# Patient Record
Sex: FEMALE | Race: WHITE | NOT HISPANIC OR LATINO | Employment: FULL TIME | URBAN - METROPOLITAN AREA
[De-identification: names, ages, dates, MRNs, and addresses within clinical notes are randomized per-mention and may not be internally consistent; named-entity substitution may affect disease eponyms.]

---

## 2018-04-14 RX ORDER — CITALOPRAM 20 MG/1
TABLET, FILM COATED ORAL
Qty: 90 TABLET | OUTPATIENT
Start: 2018-04-14

## 2018-08-03 RX ORDER — CITALOPRAM 20 MG/1
TABLET, FILM COATED ORAL
Qty: 90 TABLET | OUTPATIENT
Start: 2018-08-03

## 2019-01-31 RX ORDER — CITALOPRAM 20 MG/1
TABLET, FILM COATED ORAL
Qty: 90 TABLET | OUTPATIENT
Start: 2019-01-31

## 2023-05-11 ENCOUNTER — OFFICE VISIT (OUTPATIENT)
Dept: URGENT CARE | Facility: CLINIC | Age: 43
End: 2023-05-11
Payer: COMMERCIAL

## 2023-05-11 ENCOUNTER — HOSPITAL ENCOUNTER (OUTPATIENT)
Dept: RADIOLOGY | Facility: HOSPITAL | Age: 43
Discharge: HOME | End: 2023-05-11
Attending: PHYSICIAN ASSISTANT
Payer: COMMERCIAL

## 2023-05-11 ENCOUNTER — TELEPHONE (OUTPATIENT)
Dept: URGENT CARE | Facility: CLINIC | Age: 43
End: 2023-05-11

## 2023-05-11 VITALS
RESPIRATION RATE: 18 BRPM | TEMPERATURE: 98 F | DIASTOLIC BLOOD PRESSURE: 64 MMHG | OXYGEN SATURATION: 98 % | WEIGHT: 103 LBS | BODY MASS INDEX: 18.95 KG/M2 | SYSTOLIC BLOOD PRESSURE: 98 MMHG | HEIGHT: 62 IN | HEART RATE: 97 BPM

## 2023-05-11 DIAGNOSIS — R10.31 RIGHT LOWER QUADRANT ABDOMINAL PAIN: Primary | ICD-10-CM

## 2023-05-11 LAB
B-HCG UR QL: NEGATIVE
BILIRUB UR QL STRIP: NEGATIVE
CTP QC/QA: YES
GLUCOSE UR QL STRIP: NEGATIVE
KETONES UR QL STRIP: NEGATIVE
LEUKOCYTE ESTERASE UR QL STRIP: NEGATIVE
PH, POC UA: 8
POC BLOOD, URINE: NEGATIVE
POC NITRATES, URINE: NEGATIVE
PROT UR QL STRIP: POSITIVE
SP GR UR STRIP: 1.01 (ref 1–1.03)
UROBILINOGEN UR STRIP-ACNC: ABNORMAL (ref 0.1–1.1)

## 2023-05-11 PROCEDURE — 76705 ECHO EXAM OF ABDOMEN: CPT | Mod: TC

## 2023-05-11 PROCEDURE — 76705 US ABDOMEN LIMITED: ICD-10-PCS | Mod: 26,,, | Performed by: INTERNAL MEDICINE

## 2023-05-11 PROCEDURE — 81025 URINE PREGNANCY TEST: CPT | Mod: S$GLB,,, | Performed by: PHYSICIAN ASSISTANT

## 2023-05-11 PROCEDURE — 99213 PR OFFICE/OUTPT VISIT, EST, LEVL III, 20-29 MIN: ICD-10-PCS | Mod: S$GLB,,, | Performed by: PHYSICIAN ASSISTANT

## 2023-05-11 PROCEDURE — 99213 OFFICE O/P EST LOW 20 MIN: CPT | Mod: S$GLB,,, | Performed by: PHYSICIAN ASSISTANT

## 2023-05-11 PROCEDURE — 81025 POCT URINE PREGNANCY: ICD-10-PCS | Mod: S$GLB,,, | Performed by: PHYSICIAN ASSISTANT

## 2023-05-11 PROCEDURE — 81003 POCT URINALYSIS, DIPSTICK, AUTOMATED, W/O SCOPE: ICD-10-PCS | Mod: QW,S$GLB,, | Performed by: PHYSICIAN ASSISTANT

## 2023-05-11 PROCEDURE — 81003 URINALYSIS AUTO W/O SCOPE: CPT | Mod: QW,S$GLB,, | Performed by: PHYSICIAN ASSISTANT

## 2023-05-11 PROCEDURE — 76705 ECHO EXAM OF ABDOMEN: CPT | Mod: 26,,, | Performed by: INTERNAL MEDICINE

## 2023-05-11 RX ORDER — CITALOPRAM 20 MG/1
20 TABLET, FILM COATED ORAL
COMMUNITY
Start: 2023-04-04

## 2023-05-11 RX ORDER — SPIRONOLACTONE 25 MG/1
50 TABLET ORAL 2 TIMES DAILY
COMMUNITY
Start: 2023-04-24

## 2023-05-11 RX ORDER — BUPROPION HYDROCHLORIDE 100 MG/1
100 TABLET, EXTENDED RELEASE ORAL 2 TIMES DAILY
COMMUNITY
Start: 2023-02-15

## 2023-05-11 NOTE — PROGRESS NOTES
"Subjective:      Patient ID: Zoë Fried is a 43 y.o. female.    Vitals:  height is 5' 2" (1.575 m) and weight is 46.7 kg (103 lb). Her oral temperature is 98.2 °F (36.8 °C). Her blood pressure is 98/64 and her pulse is 97. Her respiration is 18 and oxygen saturation is 98%.     Chief Complaint: Abdominal Pain (Lower right pain)    This is a 43 y.o. female who presents today with a chief complaint of  lower right abdomen pain x last night . Pt state carolyn was washing her big dog and all of a sudden she has a shooting pain in her body.     Abdominal Pain  This is a new problem. The current episode started yesterday. The onset quality is sudden. The problem occurs intermittently. The most recent episode lasted 4 hours. The problem has been gradually worsening. The pain is located in the RLQ. The pain is at a severity of 10/10. The pain is severe. The quality of the pain is cramping and sharp. The abdominal pain radiates to the suprapubic region and pelvis. Associated symptoms include headaches and myalgias. Pertinent negatives include no anorexia, arthralgias, belching, constipation, diarrhea, dysuria, fever, flatus, frequency, hematochezia, hematuria, melena, nausea, vomiting or weight loss. The pain is aggravated by movement. The pain is relieved by Nothing. She has tried acetaminophen for the symptoms. The treatment provided no relief. Her past medical history is significant for abdominal surgery. There is no history of colon cancer, Crohn's disease, gallstones, GERD, irritable bowel syndrome, pancreatitis, PUD or ulcerative colitis. Patient's medical history does not include kidney stones and UTI.   Constitution: Negative for activity change, appetite change, chills, sweating, fatigue, fever, unexpected weight change and generalized weakness.   HENT:  Negative for ear pain, ear discharge, congestion, postnasal drip, sinus pain, sinus pressure, sore throat and trouble swallowing.    Neck: Negative for neck " pain and neck stiffness.   Cardiovascular:  Negative for chest pain and sob on exertion.   Eyes:  Negative for eye discharge and eye itching.   Respiratory:  Negative for chest tightness, shortness of breath and wheezing.    Gastrointestinal:  Positive for abdominal pain and history of abdominal surgery. Negative for abdominal bloating, nausea, vomiting, constipation, diarrhea, bright red blood in stool, dark colored stools, rectal bleeding, rectal pain, heartburn and bowel incontinence.   Genitourinary:  Positive for pelvic pain. Negative for dysuria, frequency, urgency, urine decreased, flank pain, bladder incontinence, hematuria, history of kidney stones, painful menstruation, irregular menstruation, missed menses, heavy menstrual bleeding, ovarian cysts, vaginal pain, vaginal discharge, vaginal bleeding, vaginal odor, painful ejaculation, genital sore and painful ejaculation.   Musculoskeletal:  Positive for muscle ache. Negative for joint pain.   Skin:  Negative for color change, pale, rash and erythema.   Neurological:  Positive for headaches. Negative for dizziness and altered mental status.   Psychiatric/Behavioral:  Negative for altered mental status.    History reviewed. No pertinent past medical history.    History reviewed. No pertinent surgical history.    History reviewed. No pertinent family history.    Social History     Socioeconomic History    Marital status:    Tobacco Use    Smoking status: Never    Smokeless tobacco: Never       Current Outpatient Medications   Medication Sig Dispense Refill    buPROPion (WELLBUTRIN SR) 100 MG TBSR 12 hr tablet Take 100 mg by mouth 2 (two) times daily.      citalopram (CELEXA) 20 MG tablet Take 20 mg by mouth.      spironolactone (ALDACTONE) 25 MG tablet Take 50 mg by mouth 2 (two) times daily.       No current facility-administered medications for this visit.       Review of patient's allergies indicates:  No Known Allergies     Objective:     Physical  Exam   Constitutional: She is oriented to person, place, and time. She appears well-developed.  Non-toxic appearance. She does not appear ill. No distress. normal  HENT:   Head: Normocephalic and atraumatic.   Ears:   Right Ear: External ear normal.   Left Ear: External ear normal.   Nose: Nose normal.   Mouth/Throat: Mucous membranes are normal.   Eyes: Conjunctivae and lids are normal. Right eye exhibits no discharge. Left eye exhibits no discharge.   Neck: Trachea normal. Neck supple.   Cardiovascular: Normal rate, regular rhythm and normal heart sounds.   No murmur heard.Exam reveals no gallop.   Pulmonary/Chest: Effort normal and breath sounds normal. No stridor. No respiratory distress. She has no wheezes. She has no rhonchi. She has no rales.   Abdominal: Normal appearance and bowel sounds are normal. She exhibits no shifting dullness, no distension, no fluid wave, no abdominal bruit, no pulsatile midline mass and no mass. Soft. flat abdomen There is no splenomegaly or hepatomegaly. No signs of injury. There is abdominal tenderness in the right lower quadrant and suprapubic area. There is tenderness at McBurney's point. There is no rebound, no guarding, negative Mckenzie's sign, no left CVA tenderness, negative Rovsing's sign, negative psoas sign and no right CVA tenderness. No hernia. Hernia confirmed negative in the umbilical area, confirmed negative in the ventral area, confirmed negative in the left inguinal area, confirmed negative in the right inguinal area, confirmed negative in the right femoral and confirmed negative in the no left femoral area.     Musculoskeletal: Normal range of motion.         General: No swelling or tenderness. Normal range of motion.   Neurological: no focal deficit. She is alert and oriented to person, place, and time. She has normal strength. She displays no weakness.   Skin: Skin is warm, dry, intact, not diaphoretic, not pale and no rash. No bruising and No erythema    Psychiatric: Her speech is normal and behavior is normal. Mood, judgment and thought content normal.   Nursing note and vitals reviewed.  Results for orders placed or performed in visit on 05/11/23   POCT Urinalysis, Dipstick, Automated, W/O Scope   Result Value Ref Range    POC Blood, Urine Negative Negative    POC Bilirubin, Urine Negative Negative    POC Urobilinogen, Urine norm 0.1 - 1.1    POC Ketones, Urine Negative Negative    POC Protein, Urine Positive (A) Negative    POC Nitrates, Urine Negative Negative    POC Glucose, Urine Negative Negative    pH, UA 8.0     POC Specific Gravity, Urine 1.015 1.003 - 1.029    POC Leukocytes, Urine Negative Negative   POCT urine pregnancy   Result Value Ref Range    POC Preg Test, Ur Negative Negative     Acceptable Yes          Assessment:     1. Right lower quadrant abdominal pain        Plan:       Right lower quadrant abdominal pain  -     POCT Urinalysis, Dipstick, Automated, W/O Scope  -     POCT urine pregnancy  -     US Abdomen Limited    Results reviewed    Patient Instructions   You are scheduled for an Ultrasound today at the main campus at 330 pm. You must be fasting you can have water. Use tylenol as needed for pain relief in the meantime. Will call with results once available and will make recommendations based off of them. If your symptoms worsen please go to the ER for evaluation.        Medical Decision Making:   Initial Assessment:   Right lower quadrant abdominal pain  Differential Diagnosis:   Appendicitis, ovarian cyst, muscle strain, hernia  Clinical Tests:   Lab Tests: Ordered and Reviewed  The following lab test(s) were unremarkable: UPT and Urinalysis  Radiological Study: Ordered  Urgent Care Management:  See above

## 2023-05-11 NOTE — TELEPHONE ENCOUNTER
notified patient of results, she is going to follow up with GYN for ovarian cyst. Tylenol/advil with food for pain relief. If symptoms worsen she will go to the ER for the possible need of CT and bloodwork. F/u as needed
